# Patient Record
(demographics unavailable — no encounter records)

---

## 2024-10-31 NOTE — CONSULT LETTER
[Dear  ___] : Dear  [unfilled], [Courtesy Letter:] : I had the pleasure of seeing your patient, [unfilled], in my office today. [Please see my note below.] : Please see my note below. [Consult Closing:] : Thank you very much for allowing me to participate in the care of this patient.  If you have any questions, please do not hesitate to contact me. [Sincerely,] : Sincerely, [FreeTextEntry3] : Tomás Goins MD Chief, Pediatric Otolaryngology [facility] and Dominique Caba [facility] Professor of Otolaryngology [facility] [employer] at [facility] [FreeTextEntry2] : Luis Herrera  Pediatrics [address], [address] [id number]

## 2024-10-31 NOTE — HISTORY OF PRESENT ILLNESS
[No Personal or Family History of Easy Bruising, Bleeding, or Issues with General Anesthesia] : No Personal or Family History of easy bruising, bleeding, or issues with general anesthesia [de-identified] : 9 year old with SDB and nasal congestion  Snores at night with pauses and choking  No frequent awakenings Talks in his sleep  Restless sleeper  No bedwetting  No focusing or ADHD  NO PSG   Chronic nasal congestion  Flonase with prolonged use- No changes  Last used about one month ago  Mouth breather   Strep throat several times this year - 4 times  + ear infections  No speech delay   No other medical specialists  Albuterol over the winter with illness